# Patient Record
Sex: MALE | Race: OTHER | URBAN - METROPOLITAN AREA
[De-identification: names, ages, dates, MRNs, and addresses within clinical notes are randomized per-mention and may not be internally consistent; named-entity substitution may affect disease eponyms.]

---

## 2017-01-24 NOTE — PATIENT DISCUSSION
POSTERIOR VITREOUS DETACHMENT, O_S:  NO HOLES. NO TEARS. RETINAL  DETACHMENT WARNINGS DISCUSSED. FLOATERS AND FLASHES BROCHURE GIVEN. RETURN FOR FOLLOW-UP AS SCHEDULED.

## 2017-02-28 NOTE — PATIENT DISCUSSION
POSTERIOR VITREOUS DETACHMENT, OU:  NO HOLES. NO TEARS. RETINAL  DETACHMENT WARNINGS DISCUSSED. FLOATERS AND FLASHES BROCHURE GIVEN AT LAST VISIT. RETURN FOR FOLLOW-UP AS SCHEDULED/SOONER IF INCREASE IN FLOATERS, FLASHES OR DARK CURTAIN OVER VA- TO SEE RETINA.

## 2017-11-29 NOTE — PATIENT DISCUSSION
New Prescription: doxycycline monohydrate (doxycycline monohydrate): tablet: 100 mg 1 tablet twice a day as directed by mouth 11-

## 2017-11-29 NOTE — PATIENT DISCUSSION
INTERNAL HORDEOLUM RUL: PRESCRIBED WARM COMPRESSES, EYELID SCRUBS, ERYTHROMYCIN AURA BID AND DOXYCYCLINE PO BIDx 7 DAYS. CALL/RTC IF SYMPTOMS PERSIST FOR REFERRAL TO DR. BENITEZ OR [facility].

## 2021-07-20 NOTE — PATIENT DISCUSSION
POSTERIOR VITREOUS DETACHMENT, OU: NO HOLES. NO TEARS. RETINAL DETACHMENT WARNINGS DISCUSSED. RETURN FOR FOLLOW-UP AS SCHEDULED.

## 2022-11-07 ENCOUNTER — NEW PATIENT (OUTPATIENT)
Dept: URBAN - METROPOLITAN AREA CLINIC 14 | Facility: CLINIC | Age: 62
End: 2022-11-07

## 2022-11-07 DIAGNOSIS — H25.13: ICD-10-CM

## 2022-11-07 PROCEDURE — 99499RLE REFRACTIVE CONSULT/RLE

## 2022-11-07 ASSESSMENT — VISUAL ACUITY
OS_BCVA: 20/20
OD_PH: 20/25
OD_BCVA: 20/20
OS_SC: 20/30
OD_GLARE: 20/30
OS_SC: J16
OD_SC: 20/40
OS_GLARE: 20/40
OD_SC: J2

## 2022-11-07 ASSESSMENT — KERATOMETRY
OS_K2POWER_DIOPTERS: 44.00
OD_AXISANGLE_DEGREES: 172
OS_K1POWER_DIOPTERS: 43.75
OS_AXISANGLE_DEGREES: 10
OD_K1POWER_DIOPTERS: 45.00
OD_AXISANGLE2_DEGREES: 82
OS_AXISANGLE2_DEGREES: 100
OD_K2POWER_DIOPTERS: 45.50

## 2022-11-07 ASSESSMENT — TONOMETRY
OD_IOP_MMHG: 25
OS_IOP_MMHG: 25

## 2023-01-16 ENCOUNTER — ESTABLISHED PATIENT (OUTPATIENT)
Dept: URBAN - METROPOLITAN AREA CLINIC 16 | Facility: CLINIC | Age: 63
End: 2023-01-16

## 2023-01-16 VITALS — DIASTOLIC BLOOD PRESSURE: 102 MMHG | SYSTOLIC BLOOD PRESSURE: 154 MMHG | HEIGHT: 60 IN

## 2023-01-16 DIAGNOSIS — H11.31: ICD-10-CM

## 2023-01-16 PROCEDURE — 92012 INTRM OPH EXAM EST PATIENT: CPT

## 2023-01-16 ASSESSMENT — KERATOMETRY
OS_K2POWER_DIOPTERS: 44.00
OD_AXISANGLE2_DEGREES: 82
OS_AXISANGLE_DEGREES: 10
OS_K1POWER_DIOPTERS: 43.75
OD_AXISANGLE_DEGREES: 172
OS_AXISANGLE2_DEGREES: 100
OD_K1POWER_DIOPTERS: 45.00
OD_K2POWER_DIOPTERS: 45.50

## 2023-01-16 ASSESSMENT — TONOMETRY
OS_IOP_MMHG: 20
OD_IOP_MMHG: 18

## 2023-01-16 ASSESSMENT — VISUAL ACUITY
OS_SC: 20/40
OD_SC: 20/40